# Patient Record
Sex: FEMALE | Race: BLACK OR AFRICAN AMERICAN | Employment: UNEMPLOYED | URBAN - METROPOLITAN AREA
[De-identification: names, ages, dates, MRNs, and addresses within clinical notes are randomized per-mention and may not be internally consistent; named-entity substitution may affect disease eponyms.]

---

## 2022-04-20 ENCOUNTER — TRANSCRIBE ORDER (OUTPATIENT)
Dept: REGISTRATION | Age: 67
End: 2022-04-20

## 2022-04-20 DIAGNOSIS — D17.1 BREAST LIPOMA: Primary | ICD-10-CM

## 2022-04-20 DIAGNOSIS — Z01.818 OTHER SPECIFIED PRE-OPERATIVE EXAMINATION: ICD-10-CM

## 2022-08-23 ENCOUNTER — HOSPITAL ENCOUNTER (OUTPATIENT)
Dept: PREADMISSION TESTING | Age: 67
Discharge: HOME | End: 2022-08-23

## 2022-08-23 VITALS — WEIGHT: 180 LBS | HEIGHT: 62 IN | BODY MASS INDEX: 33.13 KG/M2

## 2022-08-23 RX ORDER — GLIPIZIDE 2.5 MG/1
2.5 TABLET, EXTENDED RELEASE ORAL
COMMUNITY

## 2022-08-23 RX ORDER — LEVOFLOXACIN 5 MG/ML
500 INJECTION, SOLUTION INTRAVENOUS ONCE
Status: CANCELLED | OUTPATIENT
Start: 2022-08-23 | End: 2022-08-23

## 2022-08-23 RX ORDER — SODIUM CHLORIDE, SODIUM LACTATE, POTASSIUM CHLORIDE, CALCIUM CHLORIDE 600; 310; 30; 20 MG/100ML; MG/100ML; MG/100ML; MG/100ML
125 INJECTION, SOLUTION INTRAVENOUS CONTINUOUS
Status: CANCELLED | OUTPATIENT
Start: 2022-08-23

## 2022-08-23 RX ORDER — LANOLIN ALCOHOL/MO/W.PET/CERES
500 CREAM (GRAM) TOPICAL DAILY
COMMUNITY

## 2022-08-23 NOTE — PERIOP NOTES
PAT - SURGICAL PRE-ADMISSION INSTRUCTIONS    NAME:  Saige Caal                                                          TODAY'S DATE:  8/23/2022    SURGERY DATE:  8/25/2022                                  SURGERY ARRIVAL TIME:   tba    Do NOT eat or drink anything, including candy or gum, after MIDNIGHT on Aug 25 , unless you have specific instructions from your Surgeon or Anesthesia Provider to do so. No smoking 24 hours before surgery. No alcohol 24 hours prior to the day of surgery. No recreational drugs for one week prior to the day of surgery. Leave all valuables, including money/purse, at home. Remove all jewelry, nail polish, makeup (including mascara); no lotions, powders, deodorant, or perfume/cologne/after shave. Glasses/Contact lenses and Dentures may be worn to the hospital.  They will be removed prior to surgery. Call your doctor if symptoms of a cold or illness develop within 24 ours prior to surgery. AN ADULT MUST DRIVE YOU HOME AFTER OUTPATIENT SURGERY. If you are having an OUTPATIENT procedure, please make arrangements for a responsible adult to be with you for 24 hours after your surgery. If you are admitted to the hospital, you will be assigned to a bed after surgery is complete. Normally a family member will not be able to see you until you are in your assigned bed. 12. Visitation Restrictions Explained. Take these medications the morning of surgery with a sip of water:  Toprol, Trintellix    Patient Prep:    shower with anti-bacterial soap     These surgical instructions were reviewed with the pt during the PAT phone call     Spoke with Shakila Benson-- okay to use a magnet if cautery is involved with this procedure. Spoke with Meagan Marin at Dr. Giovany Fuentes office, no labs since March of this year. Phone pt back and made aware to come in for  pre op labs tomorrow.

## 2022-08-24 ENCOUNTER — HOSPITAL ENCOUNTER (OUTPATIENT)
Dept: PREADMISSION TESTING | Age: 67
Discharge: HOME | End: 2022-08-24
Payer: COMMERCIAL

## 2022-08-24 LAB
BASOPHILS # BLD: 0.1 K/UL (ref 0–0.1)
BASOPHILS NFR BLD: 1 % (ref 0–2)
DIFFERENTIAL METHOD BLD: ABNORMAL
EOSINOPHIL # BLD: 0.2 K/UL (ref 0–0.4)
EOSINOPHIL NFR BLD: 2 % (ref 0–5)
ERYTHROCYTE [DISTWIDTH] IN BLOOD BY AUTOMATED COUNT: 13.1 % (ref 11.6–14.5)
EST. AVERAGE GLUCOSE BLD GHB EST-MCNC: 143 MG/DL
HBA1C MFR BLD: 6.6 % (ref 4.2–5.6)
HCT VFR BLD AUTO: 41.5 % (ref 35–45)
HGB BLD-MCNC: 14 G/DL (ref 12–16)
IMM GRANULOCYTES # BLD AUTO: 0 K/UL (ref 0–0.04)
IMM GRANULOCYTES NFR BLD AUTO: 0 % (ref 0–0.5)
LYMPHOCYTES # BLD: 3.4 K/UL (ref 0.9–3.6)
LYMPHOCYTES NFR BLD: 41 % (ref 21–52)
MCH RBC QN AUTO: 32.7 PG (ref 24–34)
MCHC RBC AUTO-ENTMCNC: 33.7 G/DL (ref 31–37)
MCV RBC AUTO: 97 FL (ref 78–100)
MONOCYTES # BLD: 0.8 K/UL (ref 0.05–1.2)
MONOCYTES NFR BLD: 10 % (ref 3–10)
NEUTS SEG # BLD: 3.9 K/UL (ref 1.8–8)
NEUTS SEG NFR BLD: 46 % (ref 40–73)
NRBC # BLD: 0 K/UL (ref 0–0.01)
NRBC BLD-RTO: 0 PER 100 WBC
PLATELET # BLD AUTO: 148 K/UL (ref 135–420)
PMV BLD AUTO: 12.5 FL (ref 9.2–11.8)
RBC # BLD AUTO: 4.28 M/UL (ref 4.2–5.3)
WBC # BLD AUTO: 8.4 K/UL (ref 4.6–13.2)

## 2022-08-24 PROCEDURE — 36415 COLL VENOUS BLD VENIPUNCTURE: CPT

## 2022-08-24 PROCEDURE — 83036 HEMOGLOBIN GLYCOSYLATED A1C: CPT

## 2022-08-24 PROCEDURE — 85025 COMPLETE CBC W/AUTO DIFF WBC: CPT

## 2022-08-24 NOTE — H&P
Urology [address] [address]  Tel: [phone]  Fax: [phone]    Patient : Noble Noyola   YOB: 1955   Birth Sex: Female      Current Gender: Female      Date:  08/23/2022 10:50 AM    Visit Type:   Office Visit   Assessment/Plan  # Detail Type Description    1. Assessment Calculus of kidney (N20.0). Patient Plan Patient with a 6 mm stone lower pole of her right kidney she is having severe right-sided pain radiating down to her buttocks and across the abdomen to the groin. I explained to the patient that while her stone may be causing pain it may not be since there is no evidence of any hydronephrosis or obstruction. At this time I do recommend treatment so that we can at least will out the stone as the source of her pain. We discussed treatment options including conservative watchful waiting, lithotripsy we reviewed video on lithotripsy, we also reviewed video on ureteroscopic stone extraction. She is going to undergo ureteroscopy with holmium laser fragmentation of stone stone extraction and stent placement risks including pain infection bleeding stent irritation ureteral injury reviewed she understands will proceed. 2. Assessment Abdominal pain (R10.9). Additional Visit Information      This 61year old female presents for Kidney and/or Ureteral Stone. History of Present Illness  1. Kidney and/or Ureteral Stone   Onset was sudden. Severity level is 10. It occurs intermittently. Location of pain is right flank. The pain radiates to the groin. There is no prior history of stones. No prior pertinent history. Additional information: Pt w severe right flank pain CT scan revealed 4x6 mm stone lower pole right kidney. No hydronephrosis. Comments: Her pain is severe in nature, No nausea vomiting.   I explained to her that her pain may not be related to the stone since it is not causing any hydronephrosis or obstruction. Past Medical/Surgical History   (Detailed)  Disease/disorder Onset Date Management Date Comments     cardiac catheterization       Appendectomy       Breast biopsy       breast reduction       Cholecystectomy            Diabetes       Headache, migraine       Hemorrhoids           Problem List  Problem List reviewed.    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     Acromioclavicular joint pain 09/29/2015 N     Type 2 diabetes mellitus 09/11/2014 N     Blepharitis of upper and lower eyelids of both eyes, unspecified type 01/26/2018 N     Presbyopia 67/08/5509 N     Acute follicular conjunctivitis of left eye 05/10/2017 N     Orthostasis 04/25/2017 N     Severe episode of recurrent major depressive disorder, without psychotic features 03/09/2019 N     Syncope 04/25/2017 N     Vertigo 04/20/2012 N     History of depression 04/20/2012 N     Female bladder prolapse 01/31/2019 N     Encounter for other general examination 03/10/2019 N     Brugada syndrome 04/25/2017 N       Medications (active prior to today)  Medication Instructions Start Date Stop Date Refilled Elsewhere   Vitamin D3 2,000 unit tablet take 2 by Oral route  every day 02/03/2015 02/03/2015 N   cyanocobalamin (vit B-12) 1,000 mcg/mL injection solution inject 1 milliliter by intramuscular route  every month 02/03/2015   N   aspirin 81 mg tablet,delayed release take 1 tablet by oral route  every day 09/14/2017   N   Ventolin HFA 90 mcg/actuation aerosol inhaler inhale 2 puff by inhalation route  every 4 - 6 hours as needed 12/11/2018   N   Allegra Allergy 180 mg tablet take 1 tablet by oral route  every day 04/11/2019   N   Trintellix 20 mg tablet take 1 tablet by oral route  every day at the same time each day //   Y   Topicort 0.25 % topical spray 1 spray to affected areas  twice a day as needed 06/18/2021 06/18/2021 N   atorvastatin 80 mg tablet TAKE 1 TABLET BY MOUTH EVERY DAY 03/03/2022 03/03/2022 N   potassium chloride ER 10 mEq tablet,extended release take 1 Tablet by oral route 2 times every day with food 03/03/2022 03/03/2022 N   glipizide ER 2.5 mg tablet, extended release 24 hr take 1 tablet by oral route  every day with breakfast 03/03/2022 03/03/2022 N   Topamax 50 mg tablet take 1 tablet by oral route  every day 03/03/2022 03/03/2022 N   METOPROL SUC TAB 25MG ER TAKE 1 TABLET DAILY 05/19/2022 05/19/2022 N   cyclobenzaprine 10 mg tablet take 1 tablet by oral route 3 times every day PRN as needed 06/30/2022   N       Medication Reconciliation  Medications reconciled today.     Medication Reviewed  Adherence Medication Name Sig Desc Elsewhere Status   taking as directed Vitamin D3 2,000 unit tablet take 2 by Oral route  every day N Verified   taking as directed cyanocobalamin (vit B-12) 1,000 mcg/mL injection solution inject 1 milliliter by intramuscular route  every month N Verified   taking as directed aspirin 81 mg tablet,delayed release take 1 tablet by oral route  every day N Verified   taking as directed Allegra Allergy 180 mg tablet take 1 tablet by oral route  every day N Verified   taking as directed Ventolin HFA 90 mcg/actuation aerosol inhaler inhale 2 puff by inhalation route  every 4 - 6 hours as needed N Verified   taking as directed Trintellix 20 mg tablet take 1 tablet by oral route  every day at the same time each day Y Verified   taking as directed atorvastatin 80 mg tablet TAKE 1 TABLET BY MOUTH EVERY DAY N Verified   taking as directed Topicort 0.25 % topical spray 1 spray to affected areas  twice a day as needed N Verified taking as directed potassium chloride ER 10 mEq tablet,extended release take 1 Tablet by oral route 2 times every day with food N Verified   taking as directed Topamax 50 mg tablet take 1 tablet by oral route  every day N Verified   taking as directed glipizide ER 2.5 mg tablet, extended release 24 hr take 1 tablet by oral route  every day with breakfast N Verified   taking as directed METOPROL SUC TAB 25MG ER TAKE 1 TABLET DAILY N Verified   taking as directed cyclobenzaprine 10 mg tablet take 1 tablet by oral route 3 times every day PRN as needed N Verified   taking as directed oxycodone-acetaminophen 5 mg-325 mg tablet take 1 tablet by oral route  every 6 hours as needed as needed N Verified     Allergies  Ingredient Reaction (Severity) Medication Name Comment   AMOXICILLIN TRIHYDRATE nausea Augmentin    CARVEDILOL      DOXYCYCLINE nausea     GADOLINIUM-CONTAINING CONTRAST MEDIA hives     HYDROCHLOROTHIAZIDE  Hydrochlorothiazide W-Triamterene    LEVOTHYROXINE      LEVOTHYROXINE SODIUM hives     NITROFURANTOIN      NITROFURANTOIN MACROCRYSTALLINE nausea Macrodantin    PANTOPRAZOLE      PANTOPRAZOLE SODIUM hives Protonix    POTASSIUM CLAVULANATE nauesa Augmentin    ROSUVASTATIN CALCIUM hives Crestor    SULFA (SULFONAMIDE ANTIBIOTICS)      TRIAMTERENE  Hydrochlorothiazide W-Triamterene      Reviewed, no changes. Family History   (Detailed)    Relationship Family Member Name  Age at Death Condition Onset Age Cause of Death       Family history of Asthma  N       Family history of Diabetes mellitus  N       Family history of Cancer, uterine  N       Family history of Renal disease  N   Father    aneurysm  N   Mother    Cancer, breast  N   Paternal grandfather    Stroke  N     Social History  (Detailed)  Tobacco use reviewed. Preferred language is [place]. Marital Status/Family/Social Support  Marital status:      Tobacco use status: Occasional cigarette smoker.     Smoking status: Light tobacco smoker. Tobacco Screening  Patient has used tobacco.     Smoking Status  Type Smoking Status Usage Per Day Years Used Pack Years Total Pack Years   Cigarette Light tobacco smoker 0.5 Packs        Tobacco/Vaping Exposure  There is passive smoke exposure. Alcohol  There is no history of alcohol use. Lifestyle  Sedentary activity level. Diet  Poor. Review of Systems  System Neg/Pos Details   Constitutional Negative Chills and Fever. ENMT Negative Ear infections and Sore throat. Eyes Negative Blurred vision, Double vision and Eye pain. Respiratory Negative Asthma, Chronic cough, Dyspnea and Wheezing. Cardio Negative Chest pain. GI Negative Constipation, Decreased appetite, Diarrhea, Nausea and Vomiting. Endocrine Negative Cold intolerance, Heat intolerance, Increased thirst and Weight loss. Neuro Negative Headache and Tremors. Psych Negative Anxiety and Depression. Integumentary Negative Itching skin and Rash. MS Negative Back pain and Joint pain. Hema/Lymph Negative Easy bleeding. Vital Signs   Height  Time ft in cm Last Measured Height Position   11:52 AM 5.0 1.50 156.21 08/23/2022 0     Weight/BSA/BMI  Time lb oz kg Context BMI kg/m2 BSA m2   11:52 .60  82.372  33.76      Measured by  Time Measured by   11:52 AM Shivani Castanedano Person     Physical Exam  Exam Findings Details   Constitutional Normal Well developed. Neck Exam Normal Inspection - Normal.   Respiratory Normal Inspection - Normal.   Abdomen Normal No abdominal tenderness. Genitourinary * Pelvic deferred. CVA tenderness - RT. Genitourinary Normal No Suprapubic tenderness. No Flank mass   Psychiatric Normal Orientation - Oriented to time, place, person & situation. Appropriate mood and affect.      Immunizations Entered by History  Date Immunization   4/7/2021 12:00:00 AM SARS-COV-2 (COVID-19) vaccine, mRNA, spike protein, LNP, preservative free, 30 mcg/0.3mL dose   3/11/2021 12:00:00 AM SARS-COV-2 (COVID-19) vaccine, mRNA, spike protein, LNP, preservative free, 30 mcg/0.3mL dose   Medications (added, continued, or stopped today)  Start Date Medication Directions PRN Status PRN Reason Instruction Stop Date   04/11/2019 Allegra Allergy 180 mg tablet take 1 tablet by oral route  every day N      09/14/2017 aspirin 81 mg tablet,delayed release take 1 tablet by oral route  every day N      03/03/2022 atorvastatin 80 mg tablet TAKE 1 TABLET BY MOUTH EVERY DAY N      02/03/2015 cyanocobalamin (vit B-12) 1,000 mcg/mL injection solution inject 1 milliliter by intramuscular route  every month N      06/30/2022 cyclobenzaprine 10 mg tablet take 1 tablet by oral route 3 times every day PRN as needed Y      03/03/2022 glipizide ER 2.5 mg tablet, extended release 24 hr take 1 tablet by oral route  every day with breakfast N      08/23/2022 hydrocodone 5 mg-acetaminophen 325 mg tablet take 1 tablet by oral route  every 4 hours as needed for pain as needed Y   08/23/2022 05/19/2022 METOPROL SUC TAB 25MG ER TAKE 1 TABLET DAILY N      08/23/2022 oxycodone-acetaminophen 5 mg-325 mg tablet take 1 tablet by oral route  every 6 hours as needed as needed Y      03/03/2022 potassium chloride ER 10 mEq tablet,extended release take 1 Tablet by oral route 2 times every day with food N      03/03/2022 Topamax 50 mg tablet take 1 tablet by oral route  every day N      06/18/2021 Topicort 0.25 % topical spray 1 spray to affected areas  twice a day as needed N       Trintellix 20 mg tablet take 1 tablet by oral route  every day at the same time each day N      12/11/2018 Ventolin HFA 90 mcg/actuation aerosol inhaler inhale 2 puff by inhalation route  every 4 - 6 hours as needed N      02/03/2015 Vitamin D3 2,000 unit tablet take 2 by Oral route  every day N        Prescription Drug Monitoring Report: Accessed by Devonte Cobb MD on 8/23/2022 11:42:23 AM    Active Patient Care Team Members  Name Contact Agency Type Support Role Relationship Active Date Inactive Date Specialty   [facility]   Patient provider PCP   Family Practice       Provider:    Leidy Crane MD 08/23/2022 6:47 PM     Document generated by:  Cecy Flores 08/23/2022 06:47 PM      ----------------------------------------------------------------------------------------------------------------------------------------------------------------------      Electronically signed by Leidy Crane MD on 08/23/2022 08:52 PM

## 2022-08-24 NOTE — H&P (VIEW-ONLY)
Urology [address] [place] [address] [address] [address]  Tel: [phone]  Fax: [phone]    Patient : Filomena Esparza   YOB: 1955   Birth Sex: Female      Current Gender: Female      Date:  08/23/2022 10:50 AM    Visit Type:   Office Visit   Assessment/Plan  # Detail Type Description    1. Assessment Calculus of kidney (N20.0). Patient Plan Patient with a 6 mm stone lower pole of her right kidney she is having severe right-sided pain radiating down to her buttocks and across the abdomen to the groin. I explained to the patient that while her stone may be causing pain it may not be since there is no evidence of any hydronephrosis or obstruction. At this time I do recommend treatment so that we can at least will out the stone as the source of her pain. We discussed treatment options including conservative watchful waiting, lithotripsy we reviewed video on lithotripsy, we also reviewed video on ureteroscopic stone extraction. She is going to undergo ureteroscopy with holmium laser fragmentation of stone stone extraction and stent placement risks including pain infection bleeding stent irritation ureteral injury reviewed she understands will proceed. 2. Assessment Abdominal pain (R10.9). Additional Visit Information      This 61year old female presents for Kidney and/or Ureteral Stone. History of Present Illness  1. Kidney and/or Ureteral Stone   Onset was sudden. Severity level is 10. It occurs intermittently. Location of pain is right flank. The pain radiates to the groin. There is no prior history of stones. No prior pertinent history. Additional information: Pt w severe right flank pain CT scan revealed 4x6 mm stone lower pole right kidney. No hydronephrosis. Comments: Her pain is severe in nature, No nausea vomiting.   I explained to her that her pain may not be related to the stone since it is not causing any hydronephrosis or obstruction. Past Medical/Surgical History   (Detailed)  Disease/disorder Onset Date Management Date Comments     cardiac catheterization       Appendectomy       Breast biopsy       breast reduction       Cholecystectomy            Diabetes       Headache, migraine       Hemorrhoids           Problem List  Problem List reviewed.    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     Acromioclavicular joint pain 09/29/2015 N     Type 2 diabetes mellitus 09/11/2014 N     Blepharitis of upper and lower eyelids of both eyes, unspecified type 01/26/2018 N     Presbyopia 90/53/3559 N     Acute follicular conjunctivitis of left eye 05/10/2017 N     Orthostasis 04/25/2017 N     Severe episode of recurrent major depressive disorder, without psychotic features 03/09/2019 N     Syncope 04/25/2017 N     Vertigo 04/20/2012 N     History of depression 04/20/2012 N     Female bladder prolapse 01/31/2019 N     Encounter for other general examination 03/10/2019 N     Brugada syndrome 04/25/2017 N       Medications (active prior to today)  Medication Instructions Start Date Stop Date Refilled Elsewhere   Vitamin D3 2,000 unit tablet take 2 by Oral route  every day 02/03/2015 02/03/2015 N   cyanocobalamin (vit B-12) 1,000 mcg/mL injection solution inject 1 milliliter by intramuscular route  every month 02/03/2015   N   aspirin 81 mg tablet,delayed release take 1 tablet by oral route  every day 09/14/2017   N   Ventolin HFA 90 mcg/actuation aerosol inhaler inhale 2 puff by inhalation route  every 4 - 6 hours as needed 12/11/2018   N   Allegra Allergy 180 mg tablet take 1 tablet by oral route  every day 04/11/2019   N   Trintellix 20 mg tablet take 1 tablet by oral route  every day at the same time each day //   Y   Topicort 0.25 % topical spray 1 spray to affected areas  twice a day as needed 06/18/2021 06/18/2021 N   atorvastatin 80 mg tablet TAKE 1 TABLET BY MOUTH EVERY DAY 03/03/2022 03/03/2022 N   potassium chloride ER 10 mEq tablet,extended release take 1 Tablet by oral route 2 times every day with food 03/03/2022 03/03/2022 N   glipizide ER 2.5 mg tablet, extended release 24 hr take 1 tablet by oral route  every day with breakfast 03/03/2022 03/03/2022 N   Topamax 50 mg tablet take 1 tablet by oral route  every day 03/03/2022 03/03/2022 N   METOPROL SUC TAB 25MG ER TAKE 1 TABLET DAILY 05/19/2022 05/19/2022 N   cyclobenzaprine 10 mg tablet take 1 tablet by oral route 3 times every day PRN as needed 06/30/2022   N       Medication Reconciliation  Medications reconciled today.     Medication Reviewed  Adherence Medication Name Sig Desc Elsewhere Status   taking as directed Vitamin D3 2,000 unit tablet take 2 by Oral route  every day N Verified   taking as directed cyanocobalamin (vit B-12) 1,000 mcg/mL injection solution inject 1 milliliter by intramuscular route  every month N Verified   taking as directed aspirin 81 mg tablet,delayed release take 1 tablet by oral route  every day N Verified   taking as directed Allegra Allergy 180 mg tablet take 1 tablet by oral route  every day N Verified   taking as directed Ventolin HFA 90 mcg/actuation aerosol inhaler inhale 2 puff by inhalation route  every 4 - 6 hours as needed N Verified   taking as directed Trintellix 20 mg tablet take 1 tablet by oral route  every day at the same time each day Y Verified   taking as directed atorvastatin 80 mg tablet TAKE 1 TABLET BY MOUTH EVERY DAY N Verified   taking as directed Topicort 0.25 % topical spray 1 spray to affected areas  twice a day as needed N Verified taking as directed potassium chloride ER 10 mEq tablet,extended release take 1 Tablet by oral route 2 times every day with food N Verified   taking as directed Topamax 50 mg tablet take 1 tablet by oral route  every day N Verified   taking as directed glipizide ER 2.5 mg tablet, extended release 24 hr take 1 tablet by oral route  every day with breakfast N Verified   taking as directed METOPROL SUC TAB 25MG ER TAKE 1 TABLET DAILY N Verified   taking as directed cyclobenzaprine 10 mg tablet take 1 tablet by oral route 3 times every day PRN as needed N Verified   taking as directed oxycodone-acetaminophen 5 mg-325 mg tablet take 1 tablet by oral route  every 6 hours as needed as needed N Verified     Allergies  Ingredient Reaction (Severity) Medication Name Comment   AMOXICILLIN TRIHYDRATE nausea Augmentin    CARVEDILOL      DOXYCYCLINE nausea     GADOLINIUM-CONTAINING CONTRAST MEDIA hives     HYDROCHLOROTHIAZIDE  Hydrochlorothiazide W-Triamterene    LEVOTHYROXINE      LEVOTHYROXINE SODIUM hives     NITROFURANTOIN      NITROFURANTOIN MACROCRYSTALLINE nausea Macrodantin    PANTOPRAZOLE      PANTOPRAZOLE SODIUM hives Protonix    POTASSIUM CLAVULANATE nauesa Augmentin    ROSUVASTATIN CALCIUM hives Crestor    SULFA (SULFONAMIDE ANTIBIOTICS)      TRIAMTERENE  Hydrochlorothiazide W-Triamterene      Reviewed, no changes. Family History   (Detailed)    Relationship Family Member Name  Age at Death Condition Onset Age Cause of Death       Family history of Asthma  N       Family history of Diabetes mellitus  N       Family history of Cancer, uterine  N       Family history of Renal disease  N   Father    aneurysm  N   Mother    Cancer, breast  N   Paternal grandfather    Stroke  N     Social History  (Detailed)  Tobacco use reviewed. Preferred language is [place]. Marital Status/Family/Social Support  Marital status:      Tobacco use status: Occasional cigarette smoker.     Smoking status: Light tobacco smoker. Tobacco Screening  Patient has used tobacco.     Smoking Status  Type Smoking Status Usage Per Day Years Used Pack Years Total Pack Years   Cigarette Light tobacco smoker 0.5 Packs        Tobacco/Vaping Exposure  There is passive smoke exposure. Alcohol  There is no history of alcohol use. Lifestyle  Sedentary activity level. Diet  Poor. Review of Systems  System Neg/Pos Details   Constitutional Negative Chills and Fever. ENMT Negative Ear infections and Sore throat. Eyes Negative Blurred vision, Double vision and Eye pain. Respiratory Negative Asthma, Chronic cough, Dyspnea and Wheezing. Cardio Negative Chest pain. GI Negative Constipation, Decreased appetite, Diarrhea, Nausea and Vomiting. Endocrine Negative Cold intolerance, Heat intolerance, Increased thirst and Weight loss. Neuro Negative Headache and Tremors. Psych Negative Anxiety and Depression. Integumentary Negative Itching skin and Rash. MS Negative Back pain and Joint pain. Hema/Lymph Negative Easy bleeding. Vital Signs   Height  Time ft in cm Last Measured Height Position   11:52 AM 5.0 1.50 156.21 08/23/2022 0     Weight/BSA/BMI  Time lb oz kg Context BMI kg/m2 BSA m2   11:52 .60  82.372  33.76      Measured by  Time Measured by   11:52 AM Shivani Kunz     Physical Exam  Exam Findings Details   Constitutional Normal Well developed. Neck Exam Normal Inspection - Normal.   Respiratory Normal Inspection - Normal.   Abdomen Normal No abdominal tenderness. Genitourinary * Pelvic deferred. CVA tenderness - RT. Genitourinary Normal No Suprapubic tenderness. No Flank mass   Psychiatric Normal Orientation - Oriented to time, place, person & situation. Appropriate mood and affect.      Immunizations Entered by History  Date Immunization   4/7/2021 12:00:00 AM SARS-COV-2 (COVID-19) vaccine, mRNA, spike protein, LNP, preservative free, 30 mcg/0.3mL dose   3/11/2021 12:00:00 AM SARS-COV-2 (COVID-19) vaccine, mRNA, spike protein, LNP, preservative free, 30 mcg/0.3mL dose   Medications (added, continued, or stopped today)  Start Date Medication Directions PRN Status PRN Reason Instruction Stop Date   04/11/2019 Allegra Allergy 180 mg tablet take 1 tablet by oral route  every day N      09/14/2017 aspirin 81 mg tablet,delayed release take 1 tablet by oral route  every day N      03/03/2022 atorvastatin 80 mg tablet TAKE 1 TABLET BY MOUTH EVERY DAY N      02/03/2015 cyanocobalamin (vit B-12) 1,000 mcg/mL injection solution inject 1 milliliter by intramuscular route  every month N      06/30/2022 cyclobenzaprine 10 mg tablet take 1 tablet by oral route 3 times every day PRN as needed Y      03/03/2022 glipizide ER 2.5 mg tablet, extended release 24 hr take 1 tablet by oral route  every day with breakfast N      08/23/2022 hydrocodone 5 mg-acetaminophen 325 mg tablet take 1 tablet by oral route  every 4 hours as needed for pain as needed Y   08/23/2022 05/19/2022 METOPROL SUC TAB 25MG ER TAKE 1 TABLET DAILY N      08/23/2022 oxycodone-acetaminophen 5 mg-325 mg tablet take 1 tablet by oral route  every 6 hours as needed as needed Y      03/03/2022 potassium chloride ER 10 mEq tablet,extended release take 1 Tablet by oral route 2 times every day with food N      03/03/2022 Topamax 50 mg tablet take 1 tablet by oral route  every day N      06/18/2021 Topicort 0.25 % topical spray 1 spray to affected areas  twice a day as needed N       Trintellix 20 mg tablet take 1 tablet by oral route  every day at the same time each day N      12/11/2018 Ventolin HFA 90 mcg/actuation aerosol inhaler inhale 2 puff by inhalation route  every 4 - 6 hours as needed N      02/03/2015 Vitamin D3 2,000 unit tablet take 2 by Oral route  every day N        Prescription Drug Monitoring Report: Accessed by Sandrita Hennessy MD on 8/23/2022 11:42:23 AM    Active Patient Care Team Members  Name Contact Agency Type Support Role Relationship Active Date Inactive Date Specialty   [facility]   Patient provider PCP   Family Practice       Provider:    Phan Hernández MD 08/23/2022 6:47 PM     Document generated by:  Star Flores 08/23/2022 06:47 PM      ----------------------------------------------------------------------------------------------------------------------------------------------------------------------      Electronically signed by Phan Hernández MD on 08/23/2022 08:52 PM

## 2022-08-25 ENCOUNTER — HOSPITAL ENCOUNTER (OUTPATIENT)
Age: 67
Setting detail: OUTPATIENT SURGERY
Discharge: HOME | End: 2022-08-25
Attending: UROLOGY | Admitting: UROLOGY
Payer: COMMERCIAL

## 2022-08-25 ENCOUNTER — ANESTHESIA EVENT (OUTPATIENT)
Dept: SURGERY | Age: 67
End: 2022-08-25
Payer: COMMERCIAL

## 2022-08-25 ENCOUNTER — APPOINTMENT (OUTPATIENT)
Dept: GENERAL RADIOLOGY | Age: 67
End: 2022-08-25
Attending: UROLOGY
Payer: COMMERCIAL

## 2022-08-25 ENCOUNTER — ANESTHESIA (OUTPATIENT)
Dept: SURGERY | Age: 67
End: 2022-08-25
Payer: COMMERCIAL

## 2022-08-25 VITALS
HEIGHT: 62 IN | DIASTOLIC BLOOD PRESSURE: 72 MMHG | OXYGEN SATURATION: 95 % | SYSTOLIC BLOOD PRESSURE: 158 MMHG | TEMPERATURE: 97.4 F | RESPIRATION RATE: 16 BRPM | HEART RATE: 59 BPM | BODY MASS INDEX: 33.77 KG/M2 | WEIGHT: 183.5 LBS

## 2022-08-25 LAB
GLUCOSE BLD STRIP.AUTO-MCNC: 109 MG/DL (ref 70–110)
GLUCOSE BLD STRIP.AUTO-MCNC: 115 MG/DL (ref 70–110)

## 2022-08-25 PROCEDURE — 82360 CALCULUS ASSAY QUANT: CPT

## 2022-08-25 PROCEDURE — 74420 UROGRAPHY RTRGR +-KUB: CPT

## 2022-08-25 PROCEDURE — 82962 GLUCOSE BLOOD TEST: CPT

## 2022-08-25 PROCEDURE — 76210000006 HC OR PH I REC 0.5 TO 1 HR: Performed by: UROLOGY

## 2022-08-25 PROCEDURE — 77030040361 HC SLV COMPR DVT MDII -B: Performed by: UROLOGY

## 2022-08-25 PROCEDURE — 74011000250 HC RX REV CODE- 250: Performed by: ANESTHESIOLOGY

## 2022-08-25 PROCEDURE — 76210000026 HC REC RM PH II 1 TO 1.5 HR: Performed by: UROLOGY

## 2022-08-25 PROCEDURE — 74011250636 HC RX REV CODE- 250/636: Performed by: ANESTHESIOLOGY

## 2022-08-25 PROCEDURE — 77030020782 HC GWN BAIR PAWS FLX 3M -B: Performed by: UROLOGY

## 2022-08-25 PROCEDURE — 74011250636 HC RX REV CODE- 250/636: Performed by: UROLOGY

## 2022-08-25 PROCEDURE — 76060000032 HC ANESTHESIA 0.5 TO 1 HR: Performed by: UROLOGY

## 2022-08-25 PROCEDURE — 74011250636 HC RX REV CODE- 250/636: Performed by: NURSE ANESTHETIST, CERTIFIED REGISTERED

## 2022-08-25 PROCEDURE — C2617 STENT, NON-COR, TEM W/O DEL: HCPCS | Performed by: UROLOGY

## 2022-08-25 PROCEDURE — 2709999900 HC NON-CHARGEABLE SUPPLY: Performed by: UROLOGY

## 2022-08-25 PROCEDURE — C1769 GUIDE WIRE: HCPCS | Performed by: UROLOGY

## 2022-08-25 PROCEDURE — C1758 CATHETER, URETERAL: HCPCS | Performed by: UROLOGY

## 2022-08-25 PROCEDURE — 77030006974 HC BSKT URET RTVR BSC -C: Performed by: UROLOGY

## 2022-08-25 PROCEDURE — 76010000138 HC OR TIME 0.5 TO 1 HR: Performed by: UROLOGY

## 2022-08-25 PROCEDURE — 77030038846 HC SCPE URETSCP LITHOVUE DISP BSC -H: Performed by: UROLOGY

## 2022-08-25 PROCEDURE — 77030010103 HC SEAL PRT ENDOSC OCOA -B: Performed by: UROLOGY

## 2022-08-25 PROCEDURE — C1894 INTRO/SHEATH, NON-LASER: HCPCS | Performed by: UROLOGY

## 2022-08-25 PROCEDURE — 74011000250 HC RX REV CODE- 250: Performed by: NURSE ANESTHETIST, CERTIFIED REGISTERED

## 2022-08-25 DEVICE — URETERAL STENT
Type: IMPLANTABLE DEVICE | Site: URETER | Status: FUNCTIONAL
Brand: POLARIS™ ULTRA

## 2022-08-25 RX ORDER — ROCURONIUM BROMIDE 10 MG/ML
INJECTION, SOLUTION INTRAVENOUS AS NEEDED
Status: DISCONTINUED | OUTPATIENT
Start: 2022-08-25 | End: 2022-08-25 | Stop reason: HOSPADM

## 2022-08-25 RX ORDER — PROPOFOL 10 MG/ML
INJECTION, EMULSION INTRAVENOUS AS NEEDED
Status: DISCONTINUED | OUTPATIENT
Start: 2022-08-25 | End: 2022-08-25 | Stop reason: HOSPADM

## 2022-08-25 RX ORDER — DEXAMETHASONE SODIUM PHOSPHATE 4 MG/ML
INJECTION, SOLUTION INTRA-ARTICULAR; INTRALESIONAL; INTRAMUSCULAR; INTRAVENOUS; SOFT TISSUE AS NEEDED
Status: DISCONTINUED | OUTPATIENT
Start: 2022-08-25 | End: 2022-08-25 | Stop reason: HOSPADM

## 2022-08-25 RX ORDER — MAGNESIUM SULFATE 100 %
4 CRYSTALS MISCELLANEOUS AS NEEDED
Status: DISCONTINUED | OUTPATIENT
Start: 2022-08-25 | End: 2022-08-25 | Stop reason: HOSPADM

## 2022-08-25 RX ORDER — SODIUM CHLORIDE 0.9 % (FLUSH) 0.9 %
5-40 SYRINGE (ML) INJECTION EVERY 8 HOURS
Status: DISCONTINUED | OUTPATIENT
Start: 2022-08-25 | End: 2022-08-25 | Stop reason: HOSPADM

## 2022-08-25 RX ORDER — ONDANSETRON 2 MG/ML
INJECTION INTRAMUSCULAR; INTRAVENOUS AS NEEDED
Status: DISCONTINUED | OUTPATIENT
Start: 2022-08-25 | End: 2022-08-25 | Stop reason: HOSPADM

## 2022-08-25 RX ORDER — SODIUM CHLORIDE, SODIUM LACTATE, POTASSIUM CHLORIDE, CALCIUM CHLORIDE 600; 310; 30; 20 MG/100ML; MG/100ML; MG/100ML; MG/100ML
125 INJECTION, SOLUTION INTRAVENOUS CONTINUOUS
Status: DISCONTINUED | OUTPATIENT
Start: 2022-08-25 | End: 2022-08-25 | Stop reason: HOSPADM

## 2022-08-25 RX ORDER — SODIUM CHLORIDE 0.9 % (FLUSH) 0.9 %
5-40 SYRINGE (ML) INJECTION AS NEEDED
Status: DISCONTINUED | OUTPATIENT
Start: 2022-08-25 | End: 2022-08-25 | Stop reason: HOSPADM

## 2022-08-25 RX ORDER — LIDOCAINE HYDROCHLORIDE 20 MG/ML
INJECTION, SOLUTION EPIDURAL; INFILTRATION; INTRACAUDAL; PERINEURAL AS NEEDED
Status: DISCONTINUED | OUTPATIENT
Start: 2022-08-25 | End: 2022-08-25 | Stop reason: HOSPADM

## 2022-08-25 RX ORDER — SODIUM CHLORIDE, SODIUM LACTATE, POTASSIUM CHLORIDE, CALCIUM CHLORIDE 600; 310; 30; 20 MG/100ML; MG/100ML; MG/100ML; MG/100ML
75 INJECTION, SOLUTION INTRAVENOUS CONTINUOUS
Status: DISCONTINUED | OUTPATIENT
Start: 2022-08-25 | End: 2022-08-25 | Stop reason: HOSPADM

## 2022-08-25 RX ORDER — LEVOFLOXACIN 5 MG/ML
500 INJECTION, SOLUTION INTRAVENOUS ONCE
Status: COMPLETED | OUTPATIENT
Start: 2022-08-25 | End: 2022-08-25

## 2022-08-25 RX ORDER — SUCCINYLCHOLINE CHLORIDE 100 MG/5ML
SYRINGE (ML) INTRAVENOUS AS NEEDED
Status: DISCONTINUED | OUTPATIENT
Start: 2022-08-25 | End: 2022-08-25 | Stop reason: HOSPADM

## 2022-08-25 RX ORDER — METOCLOPRAMIDE HYDROCHLORIDE 5 MG/ML
INJECTION INTRAMUSCULAR; INTRAVENOUS AS NEEDED
Status: DISCONTINUED | OUTPATIENT
Start: 2022-08-25 | End: 2022-08-25 | Stop reason: HOSPADM

## 2022-08-25 RX ORDER — FENTANYL CITRATE 50 UG/ML
25 INJECTION, SOLUTION INTRAMUSCULAR; INTRAVENOUS AS NEEDED
Status: DISCONTINUED | OUTPATIENT
Start: 2022-08-25 | End: 2022-08-25 | Stop reason: HOSPADM

## 2022-08-25 RX ORDER — MIDAZOLAM HYDROCHLORIDE 1 MG/ML
INJECTION, SOLUTION INTRAMUSCULAR; INTRAVENOUS AS NEEDED
Status: DISCONTINUED | OUTPATIENT
Start: 2022-08-25 | End: 2022-08-25 | Stop reason: HOSPADM

## 2022-08-25 RX ORDER — ALBUTEROL SULFATE 0.83 MG/ML
2.5 SOLUTION RESPIRATORY (INHALATION) AS NEEDED
Status: DISCONTINUED | OUTPATIENT
Start: 2022-08-25 | End: 2022-08-25 | Stop reason: HOSPADM

## 2022-08-25 RX ORDER — HYDROMORPHONE HYDROCHLORIDE 1 MG/ML
0.5 INJECTION, SOLUTION INTRAMUSCULAR; INTRAVENOUS; SUBCUTANEOUS
Status: DISCONTINUED | OUTPATIENT
Start: 2022-08-25 | End: 2022-08-25 | Stop reason: HOSPADM

## 2022-08-25 RX ORDER — INSULIN LISPRO 100 [IU]/ML
INJECTION, SOLUTION INTRAVENOUS; SUBCUTANEOUS ONCE
Status: DISCONTINUED | OUTPATIENT
Start: 2022-08-25 | End: 2022-08-25 | Stop reason: HOSPADM

## 2022-08-25 RX ADMIN — ONDANSETRON HYDROCHLORIDE 4 MG: 2 INJECTION INTRAMUSCULAR; INTRAVENOUS at 15:53

## 2022-08-25 RX ADMIN — METOCLOPRAMIDE 10 MG: 5 INJECTION, SOLUTION INTRAMUSCULAR; INTRAVENOUS at 15:18

## 2022-08-25 RX ADMIN — MIDAZOLAM 2 MG: 1 INJECTION INTRAMUSCULAR; INTRAVENOUS at 15:15

## 2022-08-25 RX ADMIN — FENTANYL CITRATE 25 MCG: 50 INJECTION INTRAMUSCULAR; INTRAVENOUS at 16:20

## 2022-08-25 RX ADMIN — DEXAMETHASONE SODIUM PHOSPHATE 4 MG: 4 INJECTION, SOLUTION INTRAMUSCULAR; INTRAVENOUS at 15:31

## 2022-08-25 RX ADMIN — HYDROMORPHONE HYDROCHLORIDE 0.5 MG: 1 INJECTION, SOLUTION INTRAMUSCULAR; INTRAVENOUS; SUBCUTANEOUS at 17:52

## 2022-08-25 RX ADMIN — SODIUM CHLORIDE, SODIUM LACTATE, POTASSIUM CHLORIDE, AND CALCIUM CHLORIDE 125 ML/HR: 600; 310; 30; 20 INJECTION, SOLUTION INTRAVENOUS at 13:47

## 2022-08-25 RX ADMIN — SODIUM CHLORIDE, SODIUM LACTATE, POTASSIUM CHLORIDE, AND CALCIUM CHLORIDE 75 ML/HR: 600; 310; 30; 20 INJECTION, SOLUTION INTRAVENOUS at 16:22

## 2022-08-25 RX ADMIN — PROPOFOL 180 MG: 10 INJECTION, EMULSION INTRAVENOUS at 15:21

## 2022-08-25 RX ADMIN — FENTANYL CITRATE 25 MCG: 50 INJECTION INTRAMUSCULAR; INTRAVENOUS at 16:39

## 2022-08-25 RX ADMIN — ROCURONIUM BROMIDE 5 MG: 10 INJECTION, SOLUTION INTRAVENOUS at 15:21

## 2022-08-25 RX ADMIN — FENTANYL CITRATE 25 MCG: 50 INJECTION INTRAMUSCULAR; INTRAVENOUS at 18:06

## 2022-08-25 RX ADMIN — PROPOFOL 50 MG: 10 INJECTION, EMULSION INTRAVENOUS at 15:39

## 2022-08-25 RX ADMIN — HYDROMORPHONE HYDROCHLORIDE 0.5 MG: 1 INJECTION, SOLUTION INTRAMUSCULAR; INTRAVENOUS; SUBCUTANEOUS at 16:24

## 2022-08-25 RX ADMIN — LEVOFLOXACIN 500 MG: 5 INJECTION, SOLUTION INTRAVENOUS at 15:15

## 2022-08-25 RX ADMIN — FENTANYL CITRATE 25 MCG: 50 INJECTION INTRAMUSCULAR; INTRAVENOUS at 17:57

## 2022-08-25 RX ADMIN — LIDOCAINE HYDROCHLORIDE 80 MG: 20 INJECTION, SOLUTION INTRAVENOUS at 15:21

## 2022-08-25 RX ADMIN — Medication 100 MG: at 15:22

## 2022-08-25 NOTE — ANESTHESIA POSTPROCEDURE EVALUATION
Procedure(s):  CYSTOSCOPY, RIGHT URETEROSCOPY, LASER LITHOTRIPSY WITH HOLMIUM, RIGHT STENT PLACEMENT WITH C-ARM \"SPEC POP\" PT HAS AICD- REP SAYS OK FOR [place]. general    Anesthesia Post Evaluation        Patient location during evaluation: PACU  Patient participation: complete - patient participated  Level of consciousness: awake and alert  Pain score: 0  Pain management: adequate  Airway patency: patent  Anesthetic complications: no  Cardiovascular status: acceptable  Respiratory status: acceptable  Hydration status: acceptable  Post anesthesia nausea and vomiting:  none  Final Post Anesthesia Temperature Assessment:  Normothermia (36.0-37.5 degrees C)      INITIAL Post-op Vital signs:   Vitals Value Taken Time   /68 08/25/22 1654   Temp 36.3 °C (97.4 °F) 08/25/22 1607   Pulse 55 08/25/22 1654   Resp 17 08/25/22 1654   SpO2 100 % 08/25/22 1654   Vitals shown include unvalidated device data.

## 2022-08-25 NOTE — PERIOP NOTES
Marco López was at bedside and made aware of pt last had ASA as recent as yesterday. No new orders were received.

## 2022-08-25 NOTE — DISCHARGE INSTRUCTIONS
Lolis Richardson. Ashu Eaton M.D. [facility]  [address] [place] [address], [address] [place] [address]  Office: [phone]  Fax:    [phone]    PROCEDURE: Procedure(s):  CYSTOSCOPY, RIGHT URETEROSCOPY, LASER LITHOTRIPSY WITH HOLMIUM, RIGHT STENT PLACEMENT WITH C-ARM \"SPEC POP\" PT HAS AICD- REP SAYS [facility] IMMEDIATELY if any of the following occur: You are unable to urinate. Urgency to urinate is not uncommon. You find yourself urinating small frequent amounts associated with severe lower abdominal discomfort. Bright red blood with clots in the urine. Some reddish urine is not uncommon and should be treated with increasing the amount of fluids you drink. Temperature above 101.5° and / or chills. You are nauseous and / or vomiting and you cannot hold down any fluids. Your pain is not controlled with the pain medication prescribed. Special Considerations:     Do not drive for at least 24 hours after the procedure and until you are no longer taking narcotic pain medication and you are able to move and react without hesitation. MEDICATIONS:  Pain   []  Norco®   []  Percocet® []  Dilaudid®    [x] Ketorolac   Antibiotics   []  Cipro   [x]  Keflex    [] Levaquin   []  Bactrim DS®       Urination   []  Vesicare®   []  Flomax     Burning   [x]  Pyridium®   []  UribelTM     Nausea   []  Zofran®   []  Phenergan®     Miscellaneous   []           [] Prescriptions Written on Chart    [] Prescriptions sent Electronically           Our office will call you tomorrow to schedule your first follow-up appointment. Please contact [facility], [facility]. Urology at [phone] or go to the nearest Emergency Department / Urgent Care facility for any other medical questions or concerns.      Patient armband removed and shredded      DISCHARGE SUMMARY from Nurse    PATIENT INSTRUCTIONS:    After general anesthesia or intravenous sedation, for 24 hours or while taking prescription Narcotics:  Limit your activities  Do not drive and operate hazardous machinery  Do not make important personal or business decisions  Do  not drink alcoholic beverages  If you have not urinated within 8 hours after discharge, please contact your surgeon on call. Report the following to your surgeon:  Excessive pain, swelling, redness or odor of or around the surgical area  Temperature over 100.5  Nausea and vomiting lasting longer than 4 hours or if unable to take medications  Any signs of decreased circulation or nerve impairment to extremity: change in color, persistent  numbness, tingling, coldness or increase pain  Any questions    What to do at Home:  Recommended activity: Activity as tolerated, Activity as tolerated and no driving for today, and Ambulate in house,       If you experience any of the following symptoms as above , please follow up with Dr. Afshan Allen. *  Please give a list of your current medications to your Primary Care Provider. *  Please update this list whenever your medications are discontinued, doses are      changed, or new medications (including over-the-counter products) are added. *  Please carry medication information at all times in case of emergency situations. These are general instructions for a healthy lifestyle:    No smoking/ No tobacco products/ Avoid exposure to second hand smoke  Surgeon General's Warning:  Quitting smoking now greatly reduces serious risk to your health. Obesity, smoking, and sedentary lifestyle greatly increases your risk for illness    A healthy diet, regular physical exercise & weight monitoring are important for maintaining a healthy lifestyle    You may be retaining fluid if you have a history of heart failure or if you experience any of the following symptoms:  Weight gain of 3 pounds or more overnight or 5 pounds in a week, increased swelling in our hands or feet or shortness of breath while lying flat in bed.   Please call your doctor as soon as you notice any of these symptoms; do not wait until your next office visit. The discharge information has been reviewed with the patient and caregiver. The patient and caregiver verbalized understanding. Discharge medications reviewed with the patient and caregiver and appropriate educational materials and side effects teaching were provided.   ___________________________________________________________________________________________________________________________________

## 2022-08-25 NOTE — OP NOTES
Operative Note    Patient: Jo Smith  YOB: 1955  MRN: [id number]    Date of Procedure: 8/25/2022     Pre-Op Diagnosis: KIDNEY STONES    Post-Op Diagnosis:  Right renal calculus       Procedure(s):  CYSTOSCOPY, RIGHT URETEROSCOPY, LASER LITHOTRIPSY WITH HOLMIUM, RIGHT STENT PLACEMENT WITH C-ARM \"SPEC POP\" PT HAS AICD- REP SAYS OK FOR [place]    Surgeon(s):  Karmen Torres MD    Surgical Assistant: None    Anesthesia: General     Estimated Blood Loss (mL):  Minimal    Complications: None    Specimens:   ID Type Source Tests Collected by Time Destination   1 : Right Kidney Stone Preservative URETER, RIGHT  Karmen Torres MD 8/25/2022 1545 Pathology        Implants:   Implant Name Type Inv. Item Serial No.  Lot No. LRB No. Used Action   STENT URET 5FR L22CM PERCFLX HYDR+ DBL PGTL THRD 2 - [id number]  STENT URET 5FR L22CM PERCFLX HYDR+ DBL PGTL THRD 2  [employer] [facility]_ [id number] Right 1 Implanted       Drains: * No LDAs found *    Findings: 5 mm stone midpole right kidney    Detailed Description of Procedure:     Procedure Details: The patient was seen in the pre-operative area. The risks, benefits, complications, alternative treatment options, and expected outcomes were again discussed with the patient. The possibilities of reaction to medication, pain, infection, bleeding, major cardiovascular event, death, damage to surrounding structures were specifically addressed. Informed consent was then obtained. The site of surgery properly noted/marked. Patient brought in the operating room and placed   in supine position. After administration of general anesthesia, he was   placed in lithotomy position. Groin and genitalia prepped and draped in the   usual sterile fashion. I began with a 21-Lao cystoscope. Cystourethroscopy was performed. I identified the right ureteral orifice.    I was able to intubate the ureteral orifice with an open-ended   catheter and retrograde pyelogram was performed. There was no hydronephrosis or hydroureter identified. I then used a 0.035 sensor   wire, intubated the open-ended catheter, placed this into the collecting system. .  Then using a dual-lumen catheter, a second wire was placed into the   collecting system and an 11 x 13 x 28 Navigator sheath was placed over one of   the wires. The sheath and the wire were removed. So at this point, we had a   wire in place in the collecting system and the Navigator sheath. I then   used a flexible ureteroscope, traversed the ureter up to the collecting   system. Using holmium laser, I fragmented and dusted the stone. Using a 0 tip basket, I   removed the majority of these stone fragments,which will be sent to pathology for analysis. On   fluoroscopy, there was no evidence of any residual stone. The wire remained   in place. I removed the ureteroscope and the Navigator sheath under direct   vision and using cystoscopic guidance, a 5 x 22 double-J stent was placed   over the wire into the collecting system. I removed the   wire. There was a good coil in the kidney and a good coil in the bladder. The bladder was emptied. The patient tolerated the procedure well. The patient was   transferred to recovery room in stable condition.            Louis Barraza MD  8/25/2022  4:23 PM     Electronically Signed by Louis Barraza MD on 8/25/2022 at 4:19 PM

## 2022-08-25 NOTE — BRIEF OP NOTE
Brief Postoperative Note    Patient: David Tijerina  YOB: 1955  MRN: [id number]    Date of Procedure: 8/25/2022     Pre-Op Diagnosis: KIDNEY STONE    Post-Op Diagnosis: Same as preoperative diagnosis. Procedure(s):  CYSTOSCOPY, RIGHT URETEROSCOPY, LASER LITHOTRIPSY WITH HOLMIUM, RIGHT STENT PLACEMENT WITH C-ARM \"SPEC POP\" PT HAS AICD- REP SAYS OK FOR [place]    Surgeon(s):  Betty Hurtado MD    Surgical Assistant: None    Anesthesia: General     Estimated Blood Loss (mL): Minimal    Complications: None    Specimens:   ID Type Source Tests Collected by Time Destination   1 : Right Kidney Stone Preservative URETER, RIGHT  Betty Hurtado MD 8/25/2022 1545 Pathology        Implants:   Implant Name Type Inv.  Item Serial No.  Lot No. LRB No. Used Action   STENT URET 5FR L22CM PERCFLX HYDR+ DBL PGTL THRD 2 - [id number]  STENT URET 5FR L22CM PERCFLX HYDR+ DBL PGTL THRD 2  [facility]_ [id number] Right 1 Implanted       Drains: * No LDAs found *    Findings: 5 mm stone midpole right kidney    Electronically Signed by Arya Camarillo MD on 8/25/2022 at 4:18 PM

## 2022-08-25 NOTE — INTERVAL H&P NOTE
Update History & Physical    The Patient's History and Physical of August 23, 2022 was reviewed with the patient and I examined the patient. There was no change. The surgical site was confirmed by the patient and me. Plan:  The risk, benefits, expected outcome, and alternative to the recommended procedure have been discussed with the patient. Patient understands and wants to proceed with the procedure.     Electronically signed by Maryse Cristobal MD on 8/25/2022 at 2:10 PM

## 2022-08-25 NOTE — PERIOP NOTES
Reviewed PTA medication list with patient/caregiver and patient/caregiver denies any additional medications. Patient admits to having a responsible adult care for them at home for at least 24 hours after surgery. Patient encouraged to use gown warming system and informed that using said warming gown to regulate body temperature prior to a procedure has been shown to help reduce the risks of blood clots and infection. Patient's pharmacy of choice verified and documented in PTA medication section. Dual skin assessment & fall risk band verification completed with Nuvia Wright.

## 2022-08-25 NOTE — ANESTHESIA PREPROCEDURE EVALUATION
Relevant Problems   No relevant active problems       Anesthetic History   No history of anesthetic complications            Review of Systems / Medical History  Patient summary reviewed, nursing notes reviewed and pertinent labs reviewed    Pulmonary  Within defined limits                 Neuro/Psych   Within defined limits           Cardiovascular            Dysrhythmias   CAD    Exercise tolerance: >4 METS  Comments: Brugatta syndrome, s/p AICD   GI/Hepatic/Renal     GERD      Hiatal hernia    Comments: Improved GERD since restarting PPI Endo/Other    Diabetes: well controlled, type 2    Arthritis     Other Findings              Physical Exam    Airway  Mallampati: II  TM Distance: > 6 cm  Neck ROM: normal range of motion   Mouth opening: Normal     Cardiovascular    Rhythm: regular  Rate: normal         Dental  No notable dental hx       Pulmonary  Breath sounds clear to auscultation               Abdominal  GI exam deferred       Other Findings            Anesthetic Plan    ASA: 3  Anesthesia type: general          Induction: Intravenous  Anesthetic plan and risks discussed with: Patient      Will place a magnet on AICD

## 2022-08-30 ENCOUNTER — HOSPITAL ENCOUNTER (OUTPATIENT)
Age: 67
Setting detail: OUTPATIENT SURGERY
Discharge: HOME | End: 2022-08-30
Attending: UROLOGY | Admitting: UROLOGY
Payer: COMMERCIAL

## 2022-08-30 ENCOUNTER — ANESTHESIA (OUTPATIENT)
Dept: SURGERY | Age: 67
End: 2022-08-30
Payer: COMMERCIAL

## 2022-08-30 ENCOUNTER — APPOINTMENT (OUTPATIENT)
Dept: GENERAL RADIOLOGY | Age: 67
End: 2022-08-30
Attending: UROLOGY
Payer: COMMERCIAL

## 2022-08-30 ENCOUNTER — ANESTHESIA EVENT (OUTPATIENT)
Dept: SURGERY | Age: 67
End: 2022-08-30
Payer: COMMERCIAL

## 2022-08-30 VITALS
OXYGEN SATURATION: 95 % | WEIGHT: 180.8 LBS | RESPIRATION RATE: 16 BRPM | HEIGHT: 62 IN | SYSTOLIC BLOOD PRESSURE: 115 MMHG | BODY MASS INDEX: 33.27 KG/M2 | TEMPERATURE: 98.1 F | DIASTOLIC BLOOD PRESSURE: 60 MMHG | HEART RATE: 62 BPM

## 2022-08-30 PROBLEM — Z96.0 RETAINED URETERAL STENT: Status: ACTIVE | Noted: 2022-08-30

## 2022-08-30 LAB
CALCIUM OXALATE DIHYDRATE MFR STONE IR: 60 %
COLOR STONE: NORMAL
COM MFR STONE: 40 %
COMMENT, 120677: NORMAL
COMMENT, 519994: NORMAL
COMPOSITION, 120440: NORMAL
DISCLAIMER, STO32L: NORMAL
GLUCOSE BLD STRIP.AUTO-MCNC: 117 MG/DL (ref 70–110)
GLUCOSE BLD STRIP.AUTO-MCNC: 126 MG/DL (ref 70–110)
PHOTO, 120675: NORMAL
PLEASE NOTE, 130422: NORMAL
SIZE STONE: NORMAL MM
SPECIMEN SOURCE: NORMAL
WT STONE: 13 MG

## 2022-08-30 PROCEDURE — 74011250636 HC RX REV CODE- 250/636: Performed by: UROLOGY

## 2022-08-30 PROCEDURE — 82962 GLUCOSE BLOOD TEST: CPT

## 2022-08-30 PROCEDURE — 74011250636 HC RX REV CODE- 250/636: Performed by: NURSE ANESTHETIST, CERTIFIED REGISTERED

## 2022-08-30 PROCEDURE — C1769 GUIDE WIRE: HCPCS | Performed by: UROLOGY

## 2022-08-30 PROCEDURE — 77030020782 HC GWN BAIR PAWS FLX 3M -B: Performed by: UROLOGY

## 2022-08-30 PROCEDURE — 74011000250 HC RX REV CODE- 250: Performed by: NURSE ANESTHETIST, CERTIFIED REGISTERED

## 2022-08-30 PROCEDURE — 74420 UROGRAPHY RTRGR +-KUB: CPT

## 2022-08-30 PROCEDURE — 2709999900 HC NON-CHARGEABLE SUPPLY: Performed by: UROLOGY

## 2022-08-30 PROCEDURE — 77030012508 HC MSK AIRWY LMA AMBU -A: Performed by: ANESTHESIOLOGY

## 2022-08-30 PROCEDURE — C1758 CATHETER, URETERAL: HCPCS | Performed by: UROLOGY

## 2022-08-30 PROCEDURE — 76210000021 HC REC RM PH II 0.5 TO 1 HR: Performed by: UROLOGY

## 2022-08-30 PROCEDURE — 77030020914 HC FCPS ENDOSC DISP BSC -C: Performed by: UROLOGY

## 2022-08-30 PROCEDURE — 77030040361 HC SLV COMPR DVT MDII -B: Performed by: UROLOGY

## 2022-08-30 PROCEDURE — 74011000636 HC RX REV CODE- 636: Performed by: UROLOGY

## 2022-08-30 PROCEDURE — 74011250636 HC RX REV CODE- 250/636: Performed by: ANESTHESIOLOGY

## 2022-08-30 PROCEDURE — 76010000138 HC OR TIME 0.5 TO 1 HR: Performed by: UROLOGY

## 2022-08-30 PROCEDURE — 76210000016 HC OR PH I REC 1 TO 1.5 HR: Performed by: UROLOGY

## 2022-08-30 PROCEDURE — 76060000032 HC ANESTHESIA 0.5 TO 1 HR: Performed by: UROLOGY

## 2022-08-30 RX ORDER — ALBUTEROL SULFATE 0.83 MG/ML
2.5 SOLUTION RESPIRATORY (INHALATION) AS NEEDED
Status: DISCONTINUED | OUTPATIENT
Start: 2022-08-30 | End: 2022-08-30 | Stop reason: HOSPADM

## 2022-08-30 RX ORDER — INSULIN LISPRO 100 [IU]/ML
INJECTION, SOLUTION INTRAVENOUS; SUBCUTANEOUS ONCE
Status: DISCONTINUED | OUTPATIENT
Start: 2022-08-30 | End: 2022-08-30 | Stop reason: HOSPADM

## 2022-08-30 RX ORDER — KETOROLAC TROMETHAMINE 10 MG/1
1 TABLET, FILM COATED ORAL
COMMUNITY
Start: 2022-08-25

## 2022-08-30 RX ORDER — GENTAMICIN SULFATE 80 MG/50ML
80 INJECTION, SOLUTION INTRAVENOUS ONCE
Status: COMPLETED | OUTPATIENT
Start: 2022-08-30 | End: 2022-08-30

## 2022-08-30 RX ORDER — PROPOFOL 10 MG/ML
INJECTION, EMULSION INTRAVENOUS AS NEEDED
Status: DISCONTINUED | OUTPATIENT
Start: 2022-08-30 | End: 2022-08-30 | Stop reason: HOSPADM

## 2022-08-30 RX ORDER — MAGNESIUM SULFATE 100 %
4 CRYSTALS MISCELLANEOUS AS NEEDED
Status: DISCONTINUED | OUTPATIENT
Start: 2022-08-30 | End: 2022-08-30 | Stop reason: HOSPADM

## 2022-08-30 RX ORDER — MIDAZOLAM HYDROCHLORIDE 1 MG/ML
INJECTION, SOLUTION INTRAMUSCULAR; INTRAVENOUS AS NEEDED
Status: DISCONTINUED | OUTPATIENT
Start: 2022-08-30 | End: 2022-08-30 | Stop reason: HOSPADM

## 2022-08-30 RX ORDER — DEXAMETHASONE SODIUM PHOSPHATE 4 MG/ML
INJECTION, SOLUTION INTRA-ARTICULAR; INTRALESIONAL; INTRAMUSCULAR; INTRAVENOUS; SOFT TISSUE AS NEEDED
Status: DISCONTINUED | OUTPATIENT
Start: 2022-08-30 | End: 2022-08-30 | Stop reason: HOSPADM

## 2022-08-30 RX ORDER — ONDANSETRON 2 MG/ML
INJECTION INTRAMUSCULAR; INTRAVENOUS AS NEEDED
Status: DISCONTINUED | OUTPATIENT
Start: 2022-08-30 | End: 2022-08-30 | Stop reason: HOSPADM

## 2022-08-30 RX ORDER — HYDROMORPHONE HYDROCHLORIDE 1 MG/ML
0.5 INJECTION, SOLUTION INTRAMUSCULAR; INTRAVENOUS; SUBCUTANEOUS
Status: DISCONTINUED | OUTPATIENT
Start: 2022-08-30 | End: 2022-08-30 | Stop reason: HOSPADM

## 2022-08-30 RX ORDER — LIDOCAINE HYDROCHLORIDE 20 MG/ML
INJECTION, SOLUTION EPIDURAL; INFILTRATION; INTRACAUDAL; PERINEURAL AS NEEDED
Status: DISCONTINUED | OUTPATIENT
Start: 2022-08-30 | End: 2022-08-30 | Stop reason: HOSPADM

## 2022-08-30 RX ORDER — SODIUM CHLORIDE 0.9 % (FLUSH) 0.9 %
5-40 SYRINGE (ML) INJECTION EVERY 8 HOURS
Status: DISCONTINUED | OUTPATIENT
Start: 2022-08-30 | End: 2022-08-30 | Stop reason: HOSPADM

## 2022-08-30 RX ORDER — SODIUM CHLORIDE, SODIUM LACTATE, POTASSIUM CHLORIDE, CALCIUM CHLORIDE 600; 310; 30; 20 MG/100ML; MG/100ML; MG/100ML; MG/100ML
75 INJECTION, SOLUTION INTRAVENOUS CONTINUOUS
Status: DISCONTINUED | OUTPATIENT
Start: 2022-08-30 | End: 2022-08-30 | Stop reason: HOSPADM

## 2022-08-30 RX ORDER — FENTANYL CITRATE 50 UG/ML
INJECTION, SOLUTION INTRAMUSCULAR; INTRAVENOUS AS NEEDED
Status: DISCONTINUED | OUTPATIENT
Start: 2022-08-30 | End: 2022-08-30 | Stop reason: HOSPADM

## 2022-08-30 RX ORDER — FENTANYL CITRATE 50 UG/ML
25 INJECTION, SOLUTION INTRAMUSCULAR; INTRAVENOUS AS NEEDED
Status: DISCONTINUED | OUTPATIENT
Start: 2022-08-30 | End: 2022-08-30 | Stop reason: HOSPADM

## 2022-08-30 RX ORDER — OXYCODONE AND ACETAMINOPHEN 5; 325 MG/1; MG/1
1 TABLET ORAL
COMMUNITY
Start: 2022-08-23

## 2022-08-30 RX ORDER — SODIUM CHLORIDE, SODIUM LACTATE, POTASSIUM CHLORIDE, CALCIUM CHLORIDE 600; 310; 30; 20 MG/100ML; MG/100ML; MG/100ML; MG/100ML
125 INJECTION, SOLUTION INTRAVENOUS CONTINUOUS
Status: DISCONTINUED | OUTPATIENT
Start: 2022-08-30 | End: 2022-08-30 | Stop reason: HOSPADM

## 2022-08-30 RX ORDER — SODIUM CHLORIDE 0.9 % (FLUSH) 0.9 %
5-40 SYRINGE (ML) INJECTION AS NEEDED
Status: DISCONTINUED | OUTPATIENT
Start: 2022-08-30 | End: 2022-08-30 | Stop reason: HOSPADM

## 2022-08-30 RX ADMIN — MIDAZOLAM 2 MG: 1 INJECTION INTRAMUSCULAR; INTRAVENOUS at 15:46

## 2022-08-30 RX ADMIN — FENTANYL CITRATE 25 MCG: 50 INJECTION, SOLUTION INTRAMUSCULAR; INTRAVENOUS at 16:04

## 2022-08-30 RX ADMIN — SODIUM CHLORIDE, POTASSIUM CHLORIDE, SODIUM LACTATE AND CALCIUM CHLORIDE 125 ML/HR: 600; 310; 30; 20 INJECTION, SOLUTION INTRAVENOUS at 14:50

## 2022-08-30 RX ADMIN — DEXAMETHASONE SODIUM PHOSPHATE 4 MG: 4 INJECTION, SOLUTION INTRAMUSCULAR; INTRAVENOUS at 15:56

## 2022-08-30 RX ADMIN — PROPOFOL 150 MG: 10 INJECTION, EMULSION INTRAVENOUS at 15:50

## 2022-08-30 RX ADMIN — FENTANYL CITRATE 25 MCG: 50 INJECTION, SOLUTION INTRAMUSCULAR; INTRAVENOUS at 16:18

## 2022-08-30 RX ADMIN — HYDROMORPHONE HYDROCHLORIDE 0.5 MG: 1 INJECTION, SOLUTION INTRAMUSCULAR; INTRAVENOUS; SUBCUTANEOUS at 16:48

## 2022-08-30 RX ADMIN — LIDOCAINE HYDROCHLORIDE 100 MG: 20 INJECTION, SOLUTION INTRAVENOUS at 15:50

## 2022-08-30 RX ADMIN — GENTAMICIN SULFATE 80 MG: 80 INJECTION, SOLUTION INTRAVENOUS at 16:00

## 2022-08-30 RX ADMIN — ONDANSETRON HYDROCHLORIDE 4 MG: 2 INJECTION INTRAMUSCULAR; INTRAVENOUS at 15:56

## 2022-08-30 RX ADMIN — HYDROMORPHONE HYDROCHLORIDE 0.5 MG: 1 INJECTION, SOLUTION INTRAMUSCULAR; INTRAVENOUS; SUBCUTANEOUS at 17:04

## 2022-08-30 NOTE — DISCHARGE INSTRUCTIONS
Cystoscopy: What to Expect at [facility]     A cystoscopy is a procedure that lets a doctor look inside of the bladder and the urethra. The urethra is the tube that carries urine from the bladder to outside the body. The doctor uses a thin, lighted tool called a cystoscope. Your bladder is filled with fluid. This stretches the bladder so that your doctor can look closely at the inside of your bladder. After the cystoscopy, your urethra may be sore at first, and it may burn when you urinate for the first few days after the procedure. You may feel the need to urinate more often, and your urine may be pink. These symptoms should get better in 1 or 2 days. You will probably be able to go back to most of your usual activities in 1 or 2 days. This care sheet gives you a general idea about how long it will take for you to recover. But each person recovers at a different pace. Follow the steps below to get better as quickly as possible. How can you care for yourself at home? Activity    Rest when you feel tired. Getting enough sleep will help you recover. Try to walk each day. Start by walking a little more than you did the day before. Bit by bit, increase the amount you walk. Walking boosts blood flow and helps prevent pneumonia and constipation. Avoid strenuous activities, such as bicycle riding, jogging, weight lifting, or aerobic exercise, until your doctor says it is okay. Ask your doctor when you can drive again. Most people are able to return to work within 1 or 2 days after the procedure. You may shower and take baths as usual.     Ask your doctor when it is okay for you to have sex. Diet    You can eat your normal diet. If your stomach is upset, try bland, low-fat foods like plain rice, broiled chicken, toast, and yogurt. Drink plenty of fluids (unless your doctor tells you not to). Medicines    Take pain medicines exactly as directed.   If the doctor gave you a prescription medicine for pain, take it as prescribed. If you are not taking a prescription pain medicine, ask your doctor if you can take an over-the-counter medicine. If you think your pain medicine is making you sick to your stomach: Take your medicine after meals (unless your doctor has told you not to). Ask your doctor for a different pain medicine. If your doctor prescribed antibiotics, take them as directed. Do not stop taking them just because you feel better. You need to take the full course of antibiotics. Follow-up care is a key part of your treatment and safety. Be sure to make and go to all appointments, and call your doctor if you are having problems. It's also a good idea to know your test results and keep a list of the medicines you take. When should you call for help? Call 911 anytime you think you may need emergency care. For example, call if:    You passed out (lost consciousness). You have severe trouble breathing. You have sudden chest pain and shortness of breath, or you cough up blood. You have severe belly pain. Call your doctor now or seek immediate medical care if:    You are sick to your stomach or cannot keep fluids down. Your urine is still red or you see blood clots after you have urinated several times. You have trouble passing urine or stool, especially if you have pain or swelling in your lower belly. You have signs of a blood clot, such as:  Pain in your calf, back of the knee, thigh, or groin. Redness and swelling in your leg or groin. You develop a fever or severe chills. You have pain in your back just below your rib cage. This is called flank pain. Watch closely for changes in your health, and be sure to contact your doctor if:    You have pain or burning when you urinate. A burning feeling is normal for a day or two after the test, but call if it does not get better.      You have a frequent urge to urinate but can pass only small amounts of urine. Your urine is pink, red, or cloudy, or smells bad. It is normal for the urine to have a pinkish color for a few days after the test, but call if it does not get better. Where can you learn more? Go to http://www.gray.com/  Enter C842 in the search box to learn more about \"Cystoscopy: What to Expect at Home. \"  Current as of: October 18, 2021               Content Version: 13.2  © 2006-2022 [employer]. Care instructions adapted under license by [employer] (which disclaims liability or warranty for this information). If you have questions about a medical condition or this instruction, always ask your healthcare professional. [place] [address] any warranty or liability for your use of this information. Patient armband removed and shredded      DISCHARGE SUMMARY from Nurse    PATIENT INSTRUCTIONS:    After general anesthesia or intravenous sedation, for 24 hours or while taking prescription Narcotics:  Limit your activities  Do not drive and operate hazardous machinery  Do not make important personal or business decisions  Do  not drink alcoholic beverages  If you have not urinated within 8 hours after discharge, please contact your surgeon on call. Report the following to your surgeon:  Excessive pain, swelling, redness or odor of or around the surgical area  Temperature over 100.5  Nausea and vomiting lasting longer than 4 hours or if unable to take medications  Any signs of decreased circulation or nerve impairment to extremity: change in color, persistent  numbness, tingling, coldness or increase pain  Any questions    What to do at Home:  Recommended activity: See surgical instructions,     If you experience any of the following symptoms above, please follow up with . *  Please give a list of your current medications to your Primary Care Provider.     *  Please update this list whenever your medications are discontinued, doses are      changed, or new medications (including over-the-counter products) are added. *  Please carry medication information at all times in case of emergency situations. These are general instructions for a healthy lifestyle:    No smoking/ No tobacco products/ Avoid exposure to second hand smoke  Surgeon General's Warning:  Quitting smoking now greatly reduces serious risk to your health. Obesity, smoking, and sedentary lifestyle greatly increases your risk for illness    A healthy diet, regular physical exercise & weight monitoring are important for maintaining a healthy lifestyle    You may be retaining fluid if you have a history of heart failure or if you experience any of the following symptoms:  Weight gain of 3 pounds or more overnight or 5 pounds in a week, increased swelling in our hands or feet or shortness of breath while lying flat in bed. Please call your doctor as soon as you notice any of these symptoms; do not wait until your next office visit. The discharge information has been reviewed with the patient and caregiver. The patient and caregiver verbalized understanding. Discharge medications reviewed with the patient and caregiver and appropriate educational materials and side effects teaching were provided.   ___________________________________________________________________________________________________________________________________

## 2022-08-30 NOTE — ANESTHESIA PREPROCEDURE EVALUATION
Relevant Problems   No relevant active problems       Anesthetic History          Comments: Developed sore throat after last surgery. Review of Systems / Medical History  Patient summary reviewed, nursing notes reviewed and pertinent labs reviewed    Pulmonary  Within defined limits                 Neuro/Psych   Within defined limits           Cardiovascular            Dysrhythmias   CAD    Exercise tolerance: >4 METS  Comments: Brugatta syndrome, s/p AICD in 2006, battery change 2014.    GI/Hepatic/Renal     GERD: well controlled      Hiatal hernia     Endo/Other    Diabetes: well controlled, type 2    Arthritis     Other Findings            Physical Exam    Airway  Mallampati: II  TM Distance: > 6 cm  Neck ROM: normal range of motion   Mouth opening: Normal     Cardiovascular    Rhythm: regular  Rate: normal         Dental  No notable dental hx       Pulmonary  Breath sounds clear to auscultation               Abdominal  GI exam deferred       Other Findings            Anesthetic Plan    ASA: 3  Anesthesia type: general          Induction: Intravenous  Anesthetic plan and risks discussed with: Patient

## 2022-08-30 NOTE — INTERVAL H&P NOTE
Update History & Physical    The Patient's History and Physical of August 24, 2022 was reviewed with the patient and I examined the patient. She underwent ureteroscopy and stone treatment and stent placement. However, the stent migrated up the ureter on the right and she now needs ureteroscopy with stent removal and possible replacement with a stent on  a string. The surgical site was confirmed by the patient and me. Plan:  The risk, benefits, expected outcome, and alternative to the recommended procedure have been discussed with the patient. Patient understands and wants to proceed with the procedure.     Electronically signed by Mukund Tafoya MD on 8/30/2022 at 3:41 PM

## 2022-08-30 NOTE — BRIEF OP NOTE
Brief Postoperative Note    Patient: Gustabo Grant  YOB: 1955  MRN: [id number]    Date of Procedure: 8/30/2022     Pre-Op Diagnosis: RIGHT KIDNEY STONES,  RETAINED URETERAL STENT    Post-Op Diagnosis: Same as preoperative diagnosis. Procedure(s):  CYSTOSCOPY RIGHT RETROGRADE PYELOGRAM WITH INTERPRETATION, RIGHT URETEROSCOPY AND REMOVAL OF MIGRATED STENT WITH C-ARM    Surgeon(s):  Viviane Crisostomo MD    Surgical Assistant: None    Anesthesia: General     Estimated Blood Loss (mL): Minimal    Complications: None    Specimens: * No specimens in log *     Implants: * No implants in log *    Drains: * No LDAs found *    Findings: STENT HAD MIGRATED ABOUT 2 CM UP THE RIGHT URETER. IT WAS EXTRACTED EN TOTAL.      Electronically Signed by Donn Spencer MD on 8/30/2022 at 4:36 PM

## 2022-08-30 NOTE — ANESTHESIA POSTPROCEDURE EVALUATION
Post-Anesthesia Evaluation/Assessment    Cardiovascular Function/Vital Signs  Visit Vitals  /65   Pulse 62   Temp 36.4 °C (97.6 °F)   Resp 13   Ht 5' 1.5\" (1.562 m)   Wt 82 kg (180 lb 12.8 oz)   SpO2 99%   BMI 33.61 kg/m²       Patient is status post Procedure(s):  CYSTOSCOPY RIGHT RETROGRADE PYELOGRAM WITH INTERPRETATION, RIGHT URETEROSCOPY AND REMOVAL OF MIGRATED STENT WITH C-ARM. Nausea/Vomiting: Controlled. Postoperative hydration reviewed and adequate. Pain:  Pain Scale 1: FLACC (08/30/22 1740)  Pain Intensity 1: 4 (08/30/22 1740)   Managed. Neurological Status/Mental Status and Level of Consciousness:   Appropriately returning/progressing to baseline     Pulmonary Status:   O2 Device: None (Room air) (08/30/22 1740)   Adequate oxygenation and airway patent. No apparent anesthetic complications.      Signed By: Jess Guthrie MD    August 30, 2022

## 2022-08-31 NOTE — OP NOTES
[facility]  OPERATIVE REPORT    Name:  Violeta Vasquez  MR#:   [id number]  :  1955  ACCOUNT #:  [de-identified]  DATE OF SERVICE:  2022    PREOPERATIVE DIAGNOSIS:  The right kidney stones and retained right ureteral stent. POSTOPERATIVE DIAGNOSIS:  The right kidney stones and retained right ureteral stent. PROCEDURES PERFORMED:  Cystoscopy, right retrograde pyelogram with interpretation, right ureteroscopy, and removal of migrated stent with C-arm. SURGEON:  Katharine Bernabe MD    ASSISTANT:  None. ANESTHESIA:  General.    COMPLICATIONS:  None. SPECIMENS REMOVED:  None. IMPLANTS:  None. DRAINS:  None. ESTIMATED BLOOD LOSS:  Minimal.    FINDINGS:  The patient had a stent that had migrated about 2 centimeters up the right ureter. It was extracted in total.    INDICATIONS/CLINICAL NOTE:  The patient is a 53-year-old female, who underwent a previous ureteroscopy. She came into the office for stent removal by Dr. Cyrus Moreno today, but it had migrated proximally. She presents for ureteroscopy. PROCEDURE:  Preoperatively, the risks and benefits of the surgery were described to the patient. The risks included, but were not limited to bleeding, infection, injury to the bladder, injury to the ureter, injury to the kidney, and possible need for future procedures. The patient understood the risks and signed the informed consent. The patient was taken to the operating room and placed on the OR table in supine position. She was administered general anesthetic. She was administered intravenous antibiotics. She was placed in dorsal lithotomy position and prepped and draped in the usual sterile manner. A 22-Pashto cystoscope and sheath were then inserted transurethrally atraumatically under direct vision using a 30-degree lens. Panendoscopy was done. Bilateral ureteral orifices were in normal anatomic position. There were no stones, no tumors, and no areas of concern.   The patient does have a grade III cystocele. I cannulated the right ureteral orifice with a 5-Dominican open-ended ureteral catheter. Retrograde pyelogram was done in the usual manner using 10 mL of Visipaque dye. There was noted to be a stent approximately 2 cm proximal to the UO. There was no hydronephrosis. There were no filling defects along the entire length of the ureter up in the kidney. There were no abnormalities whatsoever otherwise other than the retained stent. I then passed a Sensor wire through the open-ended catheter up to the kidney. The open-ended catheter was removed. The scope was removed. This wire was used as a safety wire. I then passed a semi-rigid ureteroscope transurethrally under direct vision into the bladder and up the ureter. I advanced the scope and I immediately came upon the stent. Using a Grasp-it basket, I was able to lasso the stent and grab it and then extract it and pull it down the ureter under direct vision. The stent was removed completely and intact. At this point, the scope was removed and the patient was then taken out of lithotomy position, revived from anesthesia, and taken to Recovery in stable condition.       MD JUSTYN [email or web address]/[email or web address]  D:  08/30/2022 16:40  T:  08/30/2022 22:11  JOB #:  [id number]

## 2023-02-03 DIAGNOSIS — D17.1 BREAST LIPOMA: Primary | ICD-10-CM

## 2025-07-23 ENCOUNTER — TRANSCRIBE ORDERS (OUTPATIENT)
Facility: HOSPITAL | Age: 70
End: 2025-07-23

## 2025-07-23 DIAGNOSIS — D37.032 NEOPLASM OF UNCERTAIN BEHAVIOR OF THE SUBMANDIBULAR SALIVARY GLANDS: Primary | ICD-10-CM

## (undated) DEVICE — STERILE LATEX POWDER-FREE SURGICAL GLOVESWITH NITRILE COATING: Brand: PROTEXIS

## (undated) DEVICE — STRAP,POSITIONING,KNEE/BODY,FOAM,4X60": Brand: MEDLINE

## (undated) DEVICE — SOLUTION IRRIG 3000ML 0.9% SOD CHL FLX CONT 0797208] ICU MEDICAL INC]

## (undated) DEVICE — GARMENT,MEDLINE,DVT,INT,CALF,MED, GEN2: Brand: MEDLINE

## (undated) DEVICE — SEAL INSTR CYSTO ADJ BX PRT SEAL FOR ACC UP TO 6FR

## (undated) DEVICE — NITINOL STONE RETRIEVAL FORCEPS: Brand: GRASPIT

## (undated) DEVICE — GDWIRE 3CM FLX-TIP 0.038X150CM -- BX/5 SENSOR

## (undated) DEVICE — CYSTO PACK: Brand: MEDLINE INDUSTRIES, INC.

## (undated) DEVICE — DRAPE XR C ARM 41X74IN LF --

## (undated) DEVICE — NITINOL STONE RETRIEVAL BASKET: Brand: ZERO TIP

## (undated) DEVICE — GLOVE ORANGE PI 7 1/2   MSG9075

## (undated) DEVICE — TRAP MUCUS SPECIMEN 40ML -- MEDICHOICE

## (undated) DEVICE — URETERAL ACCESS SHEATH SET: Brand: NAVIGATOR HD

## (undated) DEVICE — DUAL LUMEN URETERAL CATHETER

## (undated) DEVICE — BAG PRESSURE INFUSION 3 W STOPCOCK 3000 CC PREMIERPRO DISP

## (undated) DEVICE — COATED GUIDEWIRE: Brand: LUBRIGLIDE

## (undated) DEVICE — POUCH DRNGE FLX BND INTEGR RAIL CLMP DISP EZ CTCH

## (undated) DEVICE — SEAL ENDOSCP INSTR 7FR BX SELF SEAL

## (undated) DEVICE — SINGLE-USE DIGITAL FLEXIBLE URETEROSCOPE: Brand: LITHOVUE

## (undated) DEVICE — CATHETER URET L70CM OD6FR OPN END FLEXIMA